# Patient Record
Sex: FEMALE | Race: WHITE | NOT HISPANIC OR LATINO
[De-identification: names, ages, dates, MRNs, and addresses within clinical notes are randomized per-mention and may not be internally consistent; named-entity substitution may affect disease eponyms.]

---

## 2019-03-31 PROBLEM — Z00.00 ENCOUNTER FOR PREVENTIVE HEALTH EXAMINATION: Status: ACTIVE | Noted: 2019-03-31

## 2019-04-01 ENCOUNTER — APPOINTMENT (OUTPATIENT)
Dept: GASTROENTEROLOGY | Facility: CLINIC | Age: 64
End: 2019-04-01
Payer: COMMERCIAL

## 2019-04-01 VITALS
WEIGHT: 234 LBS | OXYGEN SATURATION: 96 % | TEMPERATURE: 98.4 F | SYSTOLIC BLOOD PRESSURE: 135 MMHG | DIASTOLIC BLOOD PRESSURE: 80 MMHG | HEIGHT: 67 IN | HEART RATE: 72 BPM | BODY MASS INDEX: 36.73 KG/M2

## 2019-04-01 DIAGNOSIS — Z87.39 PERSONAL HISTORY OF OTHER DISEASES OF THE MUSCULOSKELETAL SYSTEM AND CONNECTIVE TISSUE: ICD-10-CM

## 2019-04-01 DIAGNOSIS — Z87.891 PERSONAL HISTORY OF NICOTINE DEPENDENCE: ICD-10-CM

## 2019-04-01 PROCEDURE — 99204 OFFICE O/P NEW MOD 45 MIN: CPT

## 2019-04-01 RX ORDER — POLYETHYLENE GLYCOL 3350, SODIUM CHLORIDE, SODIUM BICARBONATE AND POTASSIUM CHLORIDE WITH LEMON FLAVOR 420; 11.2; 5.72; 1.48 G/4L; G/4L; G/4L; G/4L
420 POWDER, FOR SOLUTION ORAL
Qty: 1 | Refills: 0 | Status: ACTIVE | COMMUNITY
Start: 2019-04-01 | End: 1900-01-01

## 2019-04-01 RX ORDER — TIMOLOL MALEATE 2.5 MG/ML
0.25 SOLUTION OPHTHALMIC
Refills: 0 | Status: ACTIVE | COMMUNITY

## 2019-04-01 RX ORDER — HYDROXYCHLOROQUINE SULFATE 200 MG/1
200 TABLET, FILM COATED ORAL
Refills: 0 | Status: ACTIVE | COMMUNITY

## 2019-04-01 NOTE — HISTORY OF PRESENT ILLNESS
[de-identified] : The patient is a 64-year-old white female with past medical history significant for arthritis who was referred to my office by Dr. Eugene for occasional lower GI bleeding. The patient also admits to having dyspepsia. I was asked to render an opinion for consultation for the above complaints.   The patient states that she is feeling fine.  The patient denies any abdominal pain.  The patient complains of abdominal gas and bloating.  The patient denies any nausea or vomiting.  The patient denies any gastroesophageal reflux disease or dysphagia.  The patient denies any atypical chest pain, shortness of breath or palpitations.  The patient denies any diaphoresis. The patient denies any constipation or diarrhea.  The patient has 2 bowel movements a day.  The The patient denies a change in bowel habits.  The patient denies a change in caliber of stool.  The patient denies having mucus discharge with the bowel movements.  The patient complains of occasional lower GI bleeding but denies any melena or hematemesis.  The lower GI bleeding is associated with internal hemorrhoids.  The patient denies any rectal pain or rectal pruritus. The patient denies any weight loss or anorexia.  She denies any fevers or chills.  The patient denies any jaundice or pruritus.  The patient denies any back pain.  On 13, a colonoscopy to the cecum was performed at the office.  The findings revealed moderate left-sided diverticulosis, a recto-sigmoid colon polyp and small internal hemorrhoids.  Random biopsies were taken of the cecum to assess for microscopic colitis. The colonic polyp was snared and removed.  There was no bleeding post-polypectomy.  There were no other polyps, masses, AVMs or colitis noted.  The pathology revealed colonic mucosa with preparation artifact and benign lymphoid aggregates (cecum) and a hyperplastic polyp (rectosigmoid colon polyp).  The patient tolerated the procedure well. The patient denies having a recent upper endoscopy and colonoscopy performed by another gastroenterologist.  The patient's last menstrual period was age 55. The patient is a .  The patient's first menstrual period was at age 15. The patient admits to a family history of GI problems.  The patient’s mother had a history of colonic polyps, hepatitis with cirrhosis.

## 2019-04-30 ENCOUNTER — RESULT REVIEW (OUTPATIENT)
Age: 64
End: 2019-04-30

## 2019-04-30 ENCOUNTER — OUTPATIENT (OUTPATIENT)
Dept: OUTPATIENT SERVICES | Facility: HOSPITAL | Age: 64
LOS: 1 days | End: 2019-04-30
Payer: COMMERCIAL

## 2019-04-30 ENCOUNTER — APPOINTMENT (OUTPATIENT)
Dept: GASTROENTEROLOGY | Facility: HOSPITAL | Age: 64
End: 2019-04-30

## 2019-04-30 DIAGNOSIS — Z86.010 PERSONAL HISTORY OF COLONIC POLYPS: ICD-10-CM

## 2019-04-30 PROCEDURE — 45378 DIAGNOSTIC COLONOSCOPY: CPT

## 2019-04-30 PROCEDURE — 45385 COLONOSCOPY W/LESION REMOVAL: CPT

## 2019-04-30 PROCEDURE — 45385 COLONOSCOPY W/LESION REMOVAL: CPT | Mod: PT

## 2019-04-30 PROCEDURE — 88305 TISSUE EXAM BY PATHOLOGIST: CPT | Mod: 26

## 2019-04-30 PROCEDURE — 88305 TISSUE EXAM BY PATHOLOGIST: CPT

## 2019-05-02 LAB — SURGICAL PATHOLOGY STUDY: SIGNIFICANT CHANGE UP

## 2019-06-10 ENCOUNTER — APPOINTMENT (OUTPATIENT)
Dept: GASTROENTEROLOGY | Facility: CLINIC | Age: 64
End: 2019-06-10
Payer: COMMERCIAL

## 2019-06-10 VITALS
HEIGHT: 67 IN | OXYGEN SATURATION: 97 % | BODY MASS INDEX: 37.04 KG/M2 | HEART RATE: 80 BPM | DIASTOLIC BLOOD PRESSURE: 77 MMHG | SYSTOLIC BLOOD PRESSURE: 123 MMHG | TEMPERATURE: 98.3 F | WEIGHT: 236 LBS

## 2019-06-10 DIAGNOSIS — K59.00 CONSTIPATION, UNSPECIFIED: ICD-10-CM

## 2019-06-10 DIAGNOSIS — K57.30 DIVERTICULOSIS OF LARGE INTESTINE W/OUT PERFORATION OR ABSCESS W/OUT BLEEDING: ICD-10-CM

## 2019-06-10 DIAGNOSIS — K64.8 OTHER HEMORRHOIDS: ICD-10-CM

## 2019-06-10 DIAGNOSIS — Z86.010 PERSONAL HISTORY OF COLONIC POLYPS: ICD-10-CM

## 2019-06-10 DIAGNOSIS — K30 FUNCTIONAL DYSPEPSIA: ICD-10-CM

## 2019-06-10 PROCEDURE — 99213 OFFICE O/P EST LOW 20 MIN: CPT

## 2019-06-10 NOTE — ASSESSMENT
[FreeTextEntry1] : Dyspepsia: The patient complains of dyspeptic symptoms.  The patient was advised to abide by an anti-gas diet.  The patient was given a pamphlet for anti-gas.  The patient and I reviewed the anti-gas diet at length. The patient is to start on a trial of Phazyme one tablet 3 times a day p.r.n. abdominal pain and gas.\par Constipation: The patient complains of constipation. I recommend a high-fiber diet. I recommend a trial of a probiotic such as Align once a day. I recommend a trial of Metamucil once a day for fiber supplementation.  If the symptoms persist, the patient may require a trial of Linzess 145 mcg once a day for the constipation. \par Diverticulosis: I recommend a high-fiber diet and avoid seeds. The patient is to consider a trial of Metamucil once a day for fiber supplementation. The patient is to also consider a trial of a probiotic such as Align once a day. \par Internal Hemorrhoids: The patient is to be started on a trial of Anusol H. C. suppositories one per rectum nightly and Anusol HC2 .5% cream apply to affected area twice a day p.r.n. hemorrhoidal bleeding or pain. \par Colonic Polyp: The patient was found to have colonic polyps on recent colonoscopy.  I recommend a repeat colonoscopy in 5 years to reassess for colonic polyps pending patient’s health unless symptomatic.  The patient agreed and will follow up for the procedure.\par History of Colonic Polyps: The patient has a history of colonic polyps.  I recommend a repeat colonoscopy in 5 years to reassess for colonic polyps unless symptomatic.  The patient agreed and will follow up for the procedure. \par If the symptoms persist, the patient may require an upper endoscopy to assess for peptic ulcer disease versus esophagitis. The patient was told of the risks and benefits of the procedure. The patient was told of the risks of perforation, emergency surgery, bleeding, infections and missed lesions. The patient agreed and will follow-up to reassess the symptoms. \par The patient is to follow-up in the office in 1 year to reassess the symptoms. The patient was told to call the office if any further problems. \par \par

## 2019-06-10 NOTE — HISTORY OF PRESENT ILLNESS
[None] : had no significant interval events [Heartburn] : denies heartburn [Nausea] : denies nausea [Vomiting] : denies vomiting [Diarrhea] : denies diarrhea [Yellow Skin Or Eyes (Jaundice)] : denies jaundice [Abdominal Pain] : denies abdominal pain [Rectal Pain] : denies rectal pain [Wt Gain ___ Lbs] : no recent weight gain [Wt Loss ___ Lbs] : no recent weight loss [Abdominal Swelling] : abdominal swelling [Constipation] : constipation [GERD] : no gastroesophageal reflux disease [Hiatus Hernia] : no hiatus hernia [Peptic Ulcer Disease] : no peptic ulcer disease [Pancreatitis] : no pancreatitis [Cholelithiasis] : cholelithiasis [Inflammatory Bowel Disease] : no inflammatory bowel disease [Kidney Stone] : no kidney stone [Irritable Bowel Syndrome] : no irritable bowel syndrome [Diverticulitis] : no diverticulitis [Alcohol Abuse] : no alcohol abuse [Appendectomy] : no appendectomy [Malignancy] : no malignancy [Cholecystectomy] : cholecystectomy [de-identified] : The patient states that she is feeling better. The patient denies any jaundice or pruritus.  The patient denies any chronic lower back pain. The patient denies any abdominal pain.  The patient complains of abdominal gas and bloating.  The patient denies any nausea or vomiting.  The patient denies any gastroesophageal reflux disease or dysphagia.  The patient denies any atypical chest pain, shortness of breath or palpitations.   The patient denies any diaphoresis. The patient complains of occasional constipation but denies any diarrhea.  The patient has 1 to 2 bowel movements a day.  The patient complains of a change in bowel habits.  The patient complains of a change in caliber of stool.   The patient denies having mucus discharge with the bowel movements.  The patient denies any bright red blood per rectum, melena or hematemesis.  The patient denies any rectal pain or rectal pruritus.  The patient denies any weight loss or anorexia.  She denies any fevers or chills.  The patient had a colonoscopy to the cecum at the [facility] endoscopy suite on April 30, 2019.  The colonoscopy to the cecum revealed mild left sided diverticulosis, a sigmoid colon polyp and small internal hemorrhoids.  The colonic polyp was snared and removed. There was no bleeding post polypectomy. There were no other polyps, masses, AVMs or colitis noted.  The pathology revealed a hyperplastic polyp.  The patient tolerated the procedure well.  The patient admits to a family history of GI problems. The patient’s mother had a history of colonic polyps, hepatitis with cirrhosis

## 2019-07-17 NOTE — REASON FOR VISIT
Post-Care Instructions: I reviewed with the patient in detail post-care instructions. Patient is not to engage in any heavy lifting, exercise, or swimming for the next 14 days. Should the patient develop any fevers, chills, bleeding, severe pain patient will contact the office immediately. [Follow-Up: _____] : a [unfilled] follow-up visit